# Patient Record
Sex: FEMALE | Race: BLACK OR AFRICAN AMERICAN | NOT HISPANIC OR LATINO | Employment: UNEMPLOYED | URBAN - METROPOLITAN AREA
[De-identification: names, ages, dates, MRNs, and addresses within clinical notes are randomized per-mention and may not be internally consistent; named-entity substitution may affect disease eponyms.]

---

## 2022-08-06 ENCOUNTER — HOSPITAL ENCOUNTER (EMERGENCY)
Facility: OTHER | Age: 32
Discharge: HOME | End: 2022-08-06
Attending: EMERGENCY MEDICINE

## 2022-08-06 VITALS
BODY MASS INDEX: 21.14 KG/M2 | DIASTOLIC BLOOD PRESSURE: 57 MMHG | TEMPERATURE: 99 F | HEIGHT: 61 IN | SYSTOLIC BLOOD PRESSURE: 116 MMHG | RESPIRATION RATE: 16 BRPM | OXYGEN SATURATION: 100 % | WEIGHT: 112 LBS | HEART RATE: 74 BPM

## 2022-08-06 DIAGNOSIS — H66.91 RIGHT OTITIS MEDIA, UNSPECIFIED OTITIS MEDIA TYPE: Primary | ICD-10-CM

## 2022-08-06 DIAGNOSIS — H92.01 RIGHT EAR PAIN: ICD-10-CM

## 2022-08-06 PROCEDURE — 25000003 PHARM REV CODE 250: Performed by: EMERGENCY MEDICINE

## 2022-08-06 PROCEDURE — 99284 EMERGENCY DEPT VISIT MOD MDM: CPT

## 2022-08-06 RX ORDER — AMOXICILLIN AND CLAVULANATE POTASSIUM 875; 125 MG/1; MG/1
1 TABLET, FILM COATED ORAL
Status: COMPLETED | OUTPATIENT
Start: 2022-08-06 | End: 2022-08-06

## 2022-08-06 RX ORDER — OMEPRAZOLE 20 MG/1
20 CAPSULE, DELAYED RELEASE ORAL DAILY PRN
COMMUNITY

## 2022-08-06 RX ORDER — AMOXICILLIN AND CLAVULANATE POTASSIUM 875; 125 MG/1; MG/1
1 TABLET, FILM COATED ORAL 2 TIMES DAILY
Qty: 14 TABLET | Refills: 0 | Status: SHIPPED | OUTPATIENT
Start: 2022-08-06 | End: 2022-09-26

## 2022-08-06 RX ORDER — FLUTICASONE PROPIONATE 50 MCG
1 SPRAY, SUSPENSION (ML) NASAL 2 TIMES DAILY PRN
Qty: 15 G | Refills: 0 | Status: SHIPPED | OUTPATIENT
Start: 2022-08-06

## 2022-08-06 RX ADMIN — AMOXICILLIN AND CLAVULANATE POTASSIUM 1 TABLET: 875; 125 TABLET, FILM COATED ORAL at 02:08

## 2022-08-06 NOTE — ED TRIAGE NOTES
Pt arrived with c/o R ear discomfort x 3 weeks.  States she was rx'd abx/steroid eardrops on Tuesday, but her R ear has become increasingly muffled with pressure.  Reports mild pain to R ear.  Denies any fever.  Ambulatory with steady gait.  Respirations even and unlabored.  aao x 4.

## 2022-08-06 NOTE — DISCHARGE INSTRUCTIONS
Mrs. Almaguer,    Thank you for letting me care for you today! It was nice meeting you, and I hope you feel better soon.   If you would like access to your chart and what was done today please utilize the [facility].   Please come back to [facility] for all of your future medical needs.    Our goal in the emergency department is to always give you outstanding care and exceptional service. You may receive a survey by mail or e-mail in the next week regarding your experience in our ED. We would greatly appreciate you completing and returning the survey. Your feedback provides us with a way to recognize our staff who give very good care and it helps us learn how to improve when your experience was below our aspiration of excellence.     Sincerely,    Tru Rojas MD  Board Certified Emergency Physician

## 2022-08-06 NOTE — ED PROVIDER NOTES
"Encounter Date: 8/6/2022    SCRIBE #1 NOTE: I, Klarissa Robretson, am scribing for, and in the presence of,  Tru Rojas MD. I have scribed the following portions of the note - Other sections scribed: HPI, ROS.       History     Chief Complaint   Patient presents with    Otalgia     Reports R ear pain,decreased hearing, ear fullness. Was seen at  on Tuesday and was given abx and steroids with no relief. Hx of gastroparesis , oral cancer.     Time seen by provider: 2:44 PM    This is a 32 y.o. female, with a PMHx of gastroparesis and oral cancer, who presents with complaint of otalgia of the right ear with worsened onset 2 days PTA. Patient reports that her right ear has been "giving her problems for 3 weeks." She states that she went to urgent care 5 days ago and was prescribed ear drops. She adds that the drops are not working and her symptoms seem to be worsening. Patient states that her right ear feels congested, swollen, and she is experiencing a pressure sensation. She denies nasal congestion. She has not been under water or swimming recently.  She has never anything like this in the past that she can recall, she has not take anything he notes that is not specifically painful it is more just persistently uncomfortable.      The history is provided by the patient.     Review of patient's allergies indicates:   Allergen Reactions    Acetaminophen      "Ha, hot, and my heart feels weird."     Past Medical History:   Diagnosis Date    Cancer     Gastroparesis 2007    Oral cancer 2017     History reviewed. No pertinent surgical history.  History reviewed. No pertinent family history.  Social History     Tobacco Use    Smoking status: Never Smoker    Smokeless tobacco: Never Used   Substance Use Topics    Alcohol use: Never    Drug use: Never     Review of Systems  Constitutional-no fever  HEENT-no congestion, notes right ear pain, no nasal congestion  Eyes-no redness  Respiratory-no shortness of " breath  Cardio-no chest pain  GI-no abdominal pain  Endocrine-no cold intolerance  -no difficulty urinating  MSK-no myalgias  Skin-no rashes  Allergy-no environmental allergy  Neurologic-, no headache  Hematology-no swollen nodes  Behavioral-no confusion    Physical Exam     Initial Vitals [08/06/22 1423]   BP Pulse Resp Temp SpO2   (!) 116/57 74 16 99.2 °F (37.3 °C) 100 %      MAP       --         Physical Exam  Constitutional: Well appearing, no distress.  Eyes: Conjunctivae normal.  ENT       Head: Normocephalic, atraumatic.       Nose: Normal external appearance        Mouth/Throat: no strigulous respirations   Ears-left ears normal in appearance, right ear no pain with manipulation of the or or pinna, tympanic membrane is clouded, mildly bulging,  Hematological/Lymphatic/Immunilogical: no visible lymphadenopathy   Cardiovascular: Normal rate,   Respiratory: Normal respiratory effort.   Gastrointestinal: non distended   Musculoskeletal: Normal range of motion in all extremities. No obvious deformities or swelling.  Neurologic: Alert, oriented. Normal speech and language. No gross focal neurologic deficits are appreciated.  Skin: Skin is warm, dry. No rash noted.  Psychiatric: Mood and affect are normal.   ED Course   Procedures  Labs Reviewed - No data to display       Imaging Results    None          Medications   amoxicillin-clavulanate 875-125mg per tablet 1 tablet (1 tablet Oral Given 8/6/22 1458)     Medical Decision Making:   History:   Old Medical Records: I decided to obtain old medical records.  Differential Diagnosis:   Otitis media, otitis externa, malignant otitis, mastoiditis  ED Management:  Examination is consistent with a likely otitis media incompletely treated with topical drops.  Will plan for oral antibiotics, returning case of worsening an ENT follow-up.          Scribe Attestation:   Scribe #1: I performed the above scribed service and the documentation accurately describes the services I  performed. I attest to the accuracy of the note.               Physician Attestation for Scribe: I, tru rojas, reviewed documentation as scribed in my presence, which is both accurate and complete.    Clinical Impression:   Final diagnoses:  [H66.91] Right otitis media, unspecified otitis media type (Primary)  [H92.01] Right ear pain          ED Disposition Condition    Discharge Stable        ED Prescriptions     Medication Sig Dispense Start Date End Date Auth. Provider    fluticasone propionate (FLONASE) 50 mcg/actuation nasal spray 1 spray (50 mcg total) by Each Nostril route 2 (two) times daily as needed for Rhinitis. 15 g 8/6/2022  Tru Rojas MD    amoxicillin-clavulanate 875-125mg (AUGMENTIN) 875-125 mg per tablet Take 1 tablet by mouth 2 (two) times daily. 14 tablet 8/6/2022  Tru Rojas MD        Follow-up Information     Follow up With Specialties Details Why Contact Info    Tacho Arreola MD Otolaryngology Schedule an appointment as soon as possible for a visit in 1 week For a follow up visit about today, If symptoms worsen [id number]  [facility] [address]  [phone]             Tru Rojas MD  08/06/22 5113

## 2022-08-08 ENCOUNTER — TELEPHONE (OUTPATIENT)
Dept: ADMINISTRATIVE | Facility: OTHER | Age: 32
End: 2022-08-08

## 2022-08-12 ENCOUNTER — HOSPITAL ENCOUNTER (EMERGENCY)
Facility: OTHER | Age: 32
Discharge: HOME | End: 2022-08-12
Attending: EMERGENCY MEDICINE

## 2022-08-12 ENCOUNTER — TELEPHONE (OUTPATIENT)
Dept: ADMINISTRATIVE | Facility: OTHER | Age: 32
End: 2022-08-12

## 2022-08-12 VITALS
BODY MASS INDEX: 21.14 KG/M2 | WEIGHT: 112 LBS | HEART RATE: 78 BPM | HEIGHT: 61 IN | RESPIRATION RATE: 18 BRPM | DIASTOLIC BLOOD PRESSURE: 58 MMHG | OXYGEN SATURATION: 100 % | TEMPERATURE: 98 F | SYSTOLIC BLOOD PRESSURE: 100 MMHG

## 2022-08-12 DIAGNOSIS — H66.91 RIGHT OTITIS MEDIA, UNSPECIFIED OTITIS MEDIA TYPE: Primary | ICD-10-CM

## 2022-08-12 DIAGNOSIS — H60.501 ACUTE OTITIS EXTERNA OF RIGHT EAR, UNSPECIFIED TYPE: ICD-10-CM

## 2022-08-12 PROCEDURE — 99284 EMERGENCY DEPT VISIT MOD MDM: CPT

## 2022-08-12 PROCEDURE — 63600175 PHARM REV CODE 636 W HCPCS: Performed by: NURSE PRACTITIONER

## 2022-08-12 PROCEDURE — 25000003 PHARM REV CODE 250: Performed by: NURSE PRACTITIONER

## 2022-08-12 PROCEDURE — 96372 THER/PROPH/DIAG INJ SC/IM: CPT | Performed by: NURSE PRACTITIONER

## 2022-08-12 RX ORDER — OFLOXACIN 3 MG/ML
10 SOLUTION AURICULAR (OTIC) DAILY
Qty: 4.67 ML | Refills: 0 | Status: SHIPPED | OUTPATIENT
Start: 2022-08-12 | End: 2022-08-19

## 2022-08-12 RX ORDER — LIDOCAINE HYDROCHLORIDE 10 MG/ML
2.1 INJECTION INFILTRATION; PERINEURAL ONCE
Status: COMPLETED | OUTPATIENT
Start: 2022-08-12 | End: 2022-08-12

## 2022-08-12 RX ORDER — OFLOXACIN 3 MG/ML
10 SOLUTION/ DROPS OPHTHALMIC ONCE
Status: COMPLETED | OUTPATIENT
Start: 2022-08-12 | End: 2022-08-12

## 2022-08-12 RX ORDER — CEFTRIAXONE 1 G/1
1 INJECTION, POWDER, FOR SOLUTION INTRAMUSCULAR; INTRAVENOUS ONCE
Status: COMPLETED | OUTPATIENT
Start: 2022-08-12 | End: 2022-08-12

## 2022-08-12 RX ADMIN — CEFTRIAXONE 1 G: 1 INJECTION, POWDER, FOR SOLUTION INTRAMUSCULAR; INTRAVENOUS at 02:08

## 2022-08-12 RX ADMIN — LIDOCAINE HYDROCHLORIDE 2.1 ML: 10 INJECTION, SOLUTION INFILTRATION; PERINEURAL at 02:08

## 2022-08-12 RX ADMIN — OFLOXACIN 10 DROP: 3 SOLUTION OPHTHALMIC at 02:08

## 2022-08-12 NOTE — DISCHARGE INSTRUCTIONS
**Follow up with PCP or ENT in 24-48 hours. Return to ER with worsening of symptoms.     **Over the counter Ibuprofen for pain as needed as directed on package insert. Continue previously prescribed Augmentin.

## 2022-08-12 NOTE — ED TRIAGE NOTES
Pt presents to the ER for evaluation of right ear pain for the past several weeks. Pt reports she initially was seen at urgent care and prescribed Cortisporin ear drops.  She reported no improvement in symptoms and was evaluated in the ER where she was prescribed amoxicillin.  Pt reports some noncompliance due to upset stomach, so she was only able to take half the dose for the past several days.  Today she began taking the full dose.  She noted some increase in pain and drainage of the right ear.

## 2022-08-12 NOTE — ED NOTES
LOC: The patient is awake, alert, and oriented to self, place, time, and situation. Pt is calm and cooperative. Affect is appropriate.  Speech is appropriate and clear.     APPEARANCE: Patient resting on hospital bed in no acute distress.  Patient is clean and well groomed.    SKIN: The skin is warm and dry; color consistent with ethnicity.  Patient has normal skin turgor and moist mucus membranes.  Skin intact; no breakdown or bruising noted.     MUSCULOSKELETAL: Patient moving upper and lower extremities without difficulty; denies pain in the extremities or back.  Denies weakness.     RESPIRATORY: Airway is open and patent. Respirations spontaneous, even, easy, and non-labored.  Patient has a normal effort and rate.  No accessory muscle use noted. Denies cough.     ENT; Pt reporting right ear pain and drainage x one week.    CARDIAC:  Normal rate noted.  No peripheral edema noted. No complaints of chest pain.      ABDOMEN: Soft and non tender to palpation.  No distention noted. Pt denies abdominal pain; denies nausea, vomiting, diarrhea, or constipation.    NEUROLOGIC: Eyes open spontaneously.  Behavior appropriate to situation.  Follows commands; facial expression symmetrical.  Purposeful motor response noted; normal sensation in all extremities. Pt denies headache; denies lightheadedness or dizziness; denies visual disturbances; denies loss of balance; denies unilateral weakness.

## 2022-08-12 NOTE — ED PROVIDER NOTES
"Encounter Date: 8/12/2022       History     Chief Complaint   Patient presents with    Ear Drainage     C/o right ear drainage that began on Wednesday. Stated came to ED x 6 days c/o fullness to right ear prescribed Amoxicillin- Clav 875-125mg. VSS     Chief complaint:  Ear drainage    32-year-old female presents for evaluation of right ear pain that has been ongoing over the past several weeks.  She reports that she initially was seen at urgent care and prescribed Cortisporin ear drops.  She reported no improvement in symptoms and was evaluated in the ER.  She was prescribed amoxicillin.  She reports some noncompliance due to upset stomach, so she was only able to take half the dose for the past several days.  Today she began taking the full dose.  She noted some increase in pain and drainage of the right ear, which prompted today's evaluation.  No fever.  Denies chance of pregnancy.  This is the extent of patient's complaints for this ER encounter.         The history is provided by the patient.     Review of patient's allergies indicates:   Allergen Reactions    Acetaminophen      "Ha, hot, and my heart feels weird."     Past Medical History:   Diagnosis Date    Cancer     Gastroparesis 2007    Oral cancer 2017     No past surgical history on file.  No family history on file.  Social History     Tobacco Use    Smoking status: Never Smoker    Smokeless tobacco: Never Used   Substance Use Topics    Alcohol use: Never    Drug use: Never     Review of Systems   Constitutional: Negative for fever.   HENT: Positive for ear discharge and ear pain. Negative for congestion, rhinorrhea and sore throat.    Respiratory: Negative for cough and shortness of breath.    Cardiovascular: Negative for chest pain.   Gastrointestinal: Negative for abdominal pain.   Genitourinary: Negative for difficulty urinating.   Musculoskeletal: Negative for arthralgias, back pain, myalgias and neck pain.   Skin: Negative for rash and " wound.   Neurological: Negative for weakness and headaches.   Psychiatric/Behavioral: Negative.        Physical Exam     Initial Vitals [08/12/22 1311]   BP Pulse Resp Temp SpO2   106/62 78 18 98.2 °F (36.8 °C) 99 %      MAP       --         Physical Exam    Vitals reviewed.  Constitutional: No distress.   HENT:   Head: Normocephalic and atraumatic.   Right Ear: There is drainage, swelling and tenderness. No foreign bodies. No mastoid tenderness. Tympanic membrane is injected, erythematous and bulging.   Nose: Nose normal.   Mouth/Throat: Oropharynx is clear and moist and mucous membranes are normal.   Eyes: Conjunctivae, EOM and lids are normal. Right pupil is round. Left pupil is round. Pupils are equal.   Cardiovascular: Normal rate, regular rhythm and normal heart sounds.   Pulmonary/Chest: Effort normal and breath sounds normal. No respiratory distress.   Musculoskeletal:         General: Normal range of motion.     Neurological: She is alert and oriented to person, place, and time. She has normal strength.   Skin: Skin is warm and dry. No rash noted.   Psychiatric: She has a normal mood and affect. Her speech is normal and behavior is normal.         ED Course   Procedures  Labs Reviewed - No data to display       Imaging Results    None          Medications   ofloxacin 0.3 % otic solution 10 drop (has no administration in time range)   cefTRIAXone injection 1 g (has no administration in time range)   LIDOcaine HCL 10 mg/ml (1%) injection 2.1 mL (has no administration in time range)     Medical Decision Making:   Initial Assessment:   32-year-old female presents for evaluation of continued ear pain with drainage despite being on amoxicillin antibiotics.  ED Management:  Physical exam findings consistent with acute otitis media and acute otitis externa.  TM remains intact.  Vital signs are stable without fever.  Will give Rocephin IM.  Continue Augmentin antibiotics as previously prescribed.  Will add ofloxacin  drops.  ENT referral placed.    Patient/caregiver voices understanding and feels comfortable with discharge plan.    The patient/caregiver acknowledges that close follow up with medical provider is required. Instructed to follow up with PCP or ENT within 2 days. Patient/caregiver was given specific return precautions. The patient/caregiver agrees to comply with all instruction and directions given in the ER.                       Clinical Impression:   Final diagnoses:  [H66.91] Right otitis media, unspecified otitis media type (Primary)  [H60.501] Acute otitis externa of right ear, unspecified type          ED Disposition Condition    Discharge Stable        ED Prescriptions     Medication Sig Dispense Start Date End Date Auth. Provider    ofloxacin (FLOXIN) 0.3 % otic solution Place 10 drops into the right ear once daily. for 7 days 4.67 mL 8/12/2022 8/19/2022 Sara Delgado NP        Follow-up Information     Follow up With Specialties Details Why Contact Info    [facility] E[facility] Otolaryngology Schedule an appointment as soon as possible for a visit in 2 days  [address] [facility] [address]  [phone]           Sara Delgado NP  08/12/22 9984

## 2022-08-15 ENCOUNTER — NURSE TRIAGE (OUTPATIENT)
Dept: ADMINISTRATIVE | Facility: CLINIC | Age: 32
End: 2022-08-15

## 2022-08-15 NOTE — TELEPHONE ENCOUNTER
OOC Rn Patient calling was seen Friday 8/12/22  Got shot of Rocephin,    Taking Amoxicillin, since the 9/6/22 and has been cutting the ATB in half.   And now a have a rash is on both forearms. Showed up  today on 8/15/22      Patient wants to be seen today. Wants in person.   [facility],  For any new or worsening to call back OOC RN.  Warm transfer to [facility] new appt. Todayl.     Reason for Disposition   Patient wants to be seen    Additional Information   Negative: Sounds like a life-threatening emergency to the triager   Negative: Fever and localized purple or blood-colored spots or dots that are not from injury or friction   Negative: Fever and localized rash is very painful   Negative: Patient sounds very sick or weak to the triager   Negative: Looks like a boil, infected sore, deep ulcer, or other infected rash (spreading redness, pus)   Negative: Painful rash with multiple small blisters grouped together (i.e., dermatomal distribution or 'band' or 'stripe')   Negative: Localized rash is very painful (no fever)   Negative: Localized purple or blood-colored spots or dots that are not from injury or friction (no fever)   Negative: Lyme disease suspected (e.g., bull's-eye rash or tick bite / exposure)    Protocols used: RASH OR REDNESS - [id number]

## 2022-08-16 ENCOUNTER — HOSPITAL ENCOUNTER (EMERGENCY)
Facility: OTHER | Age: 32
Discharge: HOME | End: 2022-08-17
Attending: EMERGENCY MEDICINE

## 2022-08-16 DIAGNOSIS — T78.40XA ALLERGIC REACTION, INITIAL ENCOUNTER: Primary | ICD-10-CM

## 2022-08-16 PROCEDURE — 63600175 PHARM REV CODE 636 W HCPCS: Performed by: PHYSICIAN ASSISTANT

## 2022-08-16 PROCEDURE — 99284 EMERGENCY DEPT VISIT MOD MDM: CPT | Mod: 25

## 2022-08-16 PROCEDURE — 96375 TX/PRO/DX INJ NEW DRUG ADDON: CPT

## 2022-08-16 PROCEDURE — 25000003 PHARM REV CODE 250: Performed by: PHYSICIAN ASSISTANT

## 2022-08-16 PROCEDURE — 96374 THER/PROPH/DIAG INJ IV PUSH: CPT

## 2022-08-16 RX ORDER — FAMOTIDINE 10 MG/ML
20 INJECTION INTRAVENOUS
Status: COMPLETED | OUTPATIENT
Start: 2022-08-16 | End: 2022-08-16

## 2022-08-16 RX ORDER — DIPHENHYDRAMINE HYDROCHLORIDE 50 MG/ML
25 INJECTION INTRAMUSCULAR; INTRAVENOUS
Status: COMPLETED | OUTPATIENT
Start: 2022-08-16 | End: 2022-08-16

## 2022-08-16 RX ORDER — METHYLPREDNISOLONE SOD SUCC 125 MG
125 VIAL (EA) INJECTION
Status: COMPLETED | OUTPATIENT
Start: 2022-08-16 | End: 2022-08-16

## 2022-08-16 RX ADMIN — FAMOTIDINE 20 MG: 10 INJECTION INTRAVENOUS at 11:08

## 2022-08-16 RX ADMIN — METHYLPREDNISOLONE SODIUM SUCCINATE 125 MG: 125 INJECTION, POWDER, FOR SOLUTION INTRAMUSCULAR; INTRAVENOUS at 11:08

## 2022-08-16 RX ADMIN — DIPHENHYDRAMINE HYDROCHLORIDE 25 MG: 50 INJECTION, SOLUTION INTRAMUSCULAR; INTRAVENOUS at 11:08

## 2022-08-17 ENCOUNTER — TELEPHONE (OUTPATIENT)
Dept: ADMINISTRATIVE | Facility: OTHER | Age: 32
End: 2022-08-17

## 2022-08-17 VITALS
HEART RATE: 59 BPM | WEIGHT: 112 LBS | SYSTOLIC BLOOD PRESSURE: 116 MMHG | TEMPERATURE: 98 F | RESPIRATION RATE: 19 BRPM | DIASTOLIC BLOOD PRESSURE: 63 MMHG | BODY MASS INDEX: 21.16 KG/M2 | OXYGEN SATURATION: 100 %

## 2022-08-17 NOTE — ED TRIAGE NOTES
Pt presents to the ED c/o allergic reaction. Pt reports eating shrimp for the past x2 nights and now developing hives and irritation noted to extremities and back. Denies difficulty breathing. Denies any other complaints at this time. AAOx4

## 2022-08-17 NOTE — ED PROVIDER NOTES
"Encounter Date: 8/16/2022       History     Chief Complaint   Patient presents with    Allergic Reaction     Pt ate some shrimp two nights in row and is now presenting with hives and irritation.  Hives and irritation noted to extremities and back.     Pt denies any sob and does not present with angio-edema at this time.  CBBS RR17 & 100% via room.       Patient is a 32-year-old female with no significant medical history who presents to the emergency department with concern for an allergic reaction.  She reports she has been receiving antibiotics that she finished yesterday for an ear infection.  She reports on Friday she received an IM injection of Rocephin for the ear infection.  She states she was feeling fine until 2 nights ago shortly after eating shrimp.  She reports she has never been allergic to shrimp before.  She reports within an hour she developed hives all over her body with extreme itching.  She states she took Benadryl and the symptoms resolved.  She states she ate shrimp again yesterday and the symptoms re-presented.  She reports she woke up this morning with worsening hives.  She denies any chest tightness or shortness of breath.  She denies oral cavity swelling.  She denies difficulty swallowing.  She denies nausea or vomiting.    The history is provided by the patient.     Review of patient's allergies indicates:   Allergen Reactions    Acetaminophen      "Ha, hot, and my heart feels weird."     Past Medical History:   Diagnosis Date    Cancer     Gastroparesis 2007    Oral cancer 2017     History reviewed. No pertinent surgical history.  History reviewed. No pertinent family history.  Social History     Tobacco Use    Smoking status: Never Smoker    Smokeless tobacco: Never Used   Substance Use Topics    Alcohol use: Never    Drug use: Never     Review of Systems   Constitutional: Negative for activity change, appetite change, chills, fatigue and fever.   HENT: Negative for congestion, " ear discharge, ear pain, postnasal drip, rhinorrhea, sore throat and trouble swallowing.    Respiratory: Negative for cough and shortness of breath.    Cardiovascular: Negative for chest pain.   Gastrointestinal: Negative for abdominal pain, blood in stool, constipation, diarrhea, nausea and vomiting.   Genitourinary: Negative for dysuria, flank pain and hematuria.   Musculoskeletal: Negative for back pain, neck pain and neck stiffness.   Skin: Positive for rash.   Neurological: Negative for dizziness, light-headedness and headaches.       Physical Exam     Initial Vitals [08/16/22 2300]   BP Pulse Resp Temp SpO2   131/75 76 18 97.9 °F (36.6 °C) 100 %      MAP       --         Physical Exam    Nursing note and vitals reviewed.  Constitutional: She appears well-developed and well-nourished. She is not diaphoretic. No distress.   HENT:   Head: Normocephalic.   Right Ear: Hearing and external ear normal.   Left Ear: Hearing and external ear normal.   Nose: Nose normal.   Mouth/Throat: Uvula is midline, oropharynx is clear and moist and mucous membranes are normal. No trismus in the jaw. No uvula swelling. No oropharyngeal exudate.   Eyes: Conjunctivae are normal. Pupils are equal, round, and reactive to light.   Neck:   Normal range of motion.  Cardiovascular: Normal rate and regular rhythm.   Pulmonary/Chest: Breath sounds normal.   Abdominal: Abdomen is soft. Bowel sounds are normal. There is no abdominal tenderness.   Musculoskeletal:      Cervical back: Normal range of motion.     Neurological: She is alert and oriented to person, place, and time.   Skin: Skin is warm and dry. Capillary refill takes less than 2 seconds.     Urticarial lesions to trunk and upper extremities   Psychiatric: She has a normal mood and affect.         ED Course   Procedures  Labs Reviewed - No data to display       Imaging Results    None          Medications   diphenhydrAMINE injection 25 mg (25 mg Intravenous Given 8/16/22 7761)    methylPREDNISolone sodium succinate injection 125 mg (125 mg Intravenous Given 8/16/22 0512)   famotidine (PF) injection 20 mg (20 mg Intravenous Given 8/16/22 2321)     Medical Decision Making:   Initial Assessment:    Urgent evaluation of a 32-year-old female who presents to the emergency department with hives.  Patient is afebrile, nontoxic appearing, hemodynamically stable.  Symptoms have been intermittent over the last 2 days.  She has no oral cavity swelling.  No difficulty swallowing.  No chest tightness or shortness of breath.  No nausea or vomiting.  Patient does have urticarial lesions noted to upper extremities and trunk.  Unsure if this is due to the shellfish or antibiotics.  Patient is finished her antibiotics.  She is advised to avoid shellfish at this time.  She will be given Solu-Medrol and Benadryl here.  Do not feel that epi is warranted at this time.    ED Management:  11:54 PM  Plan will be to observe her for another hour and then discharge if continuing to improve.                      Clinical Impression:   Final diagnoses:  [T78.40XA] Allergic reaction, initial encounter (Primary)                 Ronda Cantu PA-C  08/16/22 2078

## 2022-09-24 NOTE — PROGRESS NOTES
ALLERGY & IMMUNOLOGY CLINIC - INITIAL CONSULTATION      HISTORY OF PRESENT ILLNESS     Patient ID: Laurie Almaguer is a 32 y.o. female    CC: rash    HPI: Laurie Almaguer is a 32 y.o. female presenting for a recent rash.  She was referred by Ronda Cantu PA-C (EM).    On 8/15/22, she had shrimp and grits for dinner. Had hives on her arms the next morning (8/16/22). She had a ceftriaxone shot on 8/12/22 for an ear infection. She had shrimp again the night of 8/16/22. Still had the rash. The next morning, it was more widespread, very itchy. She had taken a benadryl on the 16th without help. She started feeling hot and flushed. She went to ED on 8/17/22. She received benadryl, famotidine, and IV steroid. Rash was still there, but itching got better. Rash went down over next 2-3 days. The individual lesions were there for days. No respiratory or GI symptoms. The cousin told her she woke up the same day with a similar rash (but the patient didn't actually see it). The cousin had eaten with them the night before. The day before the rash, they had been moving their other cousin into dorm at [facility]. There was a lot of outdoor activity.    Rash was on her arms, sides of her torso, and the back of her legs.    She has not had shellfish since.    She had never had a rash like this before.    She has been getting mouth itching with cantaloupe. Also starting to occur with honeydew.    She gets rhinitis, but its not too bothersome. Only takes benadryl prn, doesn't usually need to take medications for it.     REVIEW OF SYSTEMS     CONST: no F/C/NS, no unexplained weight changes  PULM: no SOB, no wheezing, no cough  GI: no pain, no N/V/D  DERM: no current rashes, no skin breaks     MEDICAL HISTORY     Vaccines:      There is no immunization history on file for this patient.    MedHx:   There is no problem list on file for this patient.      SurgHx:   History reviewed. No pertinent surgical history.    Medications:  "  Current Outpatient Medications on File Prior to Visit   Medication Sig Dispense Refill    fluticasone propionate (FLONASE) 50 mcg/actuation nasal spray 1 spray (50 mcg total) by Each Nostril route 2 (two) times daily as needed for Rhinitis. (Patient not taking: Reported on 9/26/2022) 15 g 0    omeprazole (PRILOSEC) 20 MG capsule Take 20 mg by mouth daily as needed.      [DISCONTINUED] amoxicillin-clavulanate 875-125mg (AUGMENTIN) 875-125 mg per tablet Take 1 tablet by mouth 2 (two) times daily. 14 tablet 0     No current facility-administered medications on file prior to visit.     H/o Asthma: denies  H/o Eczema: denies  H/o Rhinitis: endorses, mild    Drug Allergies:   Review of patient's allergies indicates:   Allergen Reactions    Acetaminophen      "Ha, hot, and my heart feels weird."      Env/Occ:   Pets: no  Occupation: in school, studying music therapy    SocHx:   Social History     Tobacco Use    Smoking status: Never    Smokeless tobacco: Never   Substance Use Topics    Alcohol use: Never    Drug use: Never       FamHx:   Family History   Problem Relation Age of Onset    Allergies Mother     Allergies Sister     Allergies Brother         PHYSICAL EXAM     VS: Ht 5' 1" (1.549 m)   Wt 49.6 kg (109 lb 5.6 oz)   BMI 20.66 kg/m²   GENERAL: Alert, NAD, well-appearing, cooperative  EYES: EOMI, no conjunctival injection, no discharge, no infraorbital shiners  NOSE: NT 2-3 + B/L, no stringing mucus, no polyps visualized  ORAL: MMM, no ulcers, no thrush  LUNGS: CTAB, no w/r/c, no increased WOB  HEART: RRR, normal S1/S2, no m/g/r  EXTREMITIES: No LE edema  DERM: no rashes, no skin breaks  NEURO: normal speech, normal gait, no facial asymmetry     LABORATORY STUDIES     No pertinent labs for this visit.     ALLERGEN TESTING     Immunocaps: ordered for shrimp     CHART REVIEW     Reviewed ED note.     ASSESSMENT & PLAN     Laurie Almaguer is a 32 y.o. female with     # Rash: Patient was concerned she had urticaria " due to shrimp, but photos more consistent with a papular rash. And timing would not be consistent (started the next morning; lasted days). On 8/12/22, she had a ceftriaxone injection for an ear infection. On 8/15, she moved her cousin into dorm at [facility], spent a lot of time outside (other cousin also helped). On 8/16, rash started. On 8/17, went to ED where she received antihistamines and steroid injection. Pruritus improved while there, rash improved over next 2-3 days. Patient also learned that her other cousin who helped developed a rash on 8/16/22. I explained that this is not consistent with a food allergy, but will do testing for shrimp for reassurance per patient's request. I think the more likely culprit would either be the ceftriaxone (a delayed drug rash) or an allergic contact derm to something they were exposed to while moving cousin into dorm (which could explain why the other cousin got a rash at the same time). I explained that skin prick testing for ceftriaxone would not be helpful, as this would evaluate for an immediate IgE-mediated allergy. I also explained that the only way to know for sure whether it was the ceftriaxone would be to receive it again.  -Discussed with patient. We decided to refrain from putting ceftriaxone on her allergy list, as this could result in unnecessary avoidance of other cephalosporins (or even penicillins). Patient knows that if she is ever offered ceftriaxone again, this could result in rash, but this would help to determine whether she is allergic. Explained that the reaction would not be considered life-threatening, and can be treated with corticosteroids again.  -immunocaps for shrimp ordered. Counseled patient that a positive result does not always equate to clinical allergy. Explained that while a negative test would be reassuring, a positive test would not be convincing of allergy, especially as her symptoms were not consistent with this. Would likely recommend  reintroducing shrimp into diet regardless of result.    # Oral allergy syndrome: Occurs with cantaloupe, also starting to happen with honeydew. Explained the nature of food-pollen allergy syndrome. Explained that it is unlikely to develop into systemic allergy.    Follow up: as needed.     I spent a total of 45 minutes on the day of the visit.  This includes face to face time and non-face to face time preparing to see the patient (eg, review of tests), obtaining and/or reviewing separately obtained history, documenting clinical information in the electronic or other health record, independently interpreting results and communicating results to the patient/family/caregiver, or care coordinator.        Sandee Carcamo MD  Allergy/Immunology

## 2022-09-26 ENCOUNTER — OFFICE VISIT (OUTPATIENT)
Dept: ALLERGY | Facility: CLINIC | Age: 32
End: 2022-09-26

## 2022-09-26 ENCOUNTER — LAB VISIT (OUTPATIENT)
Dept: LAB | Facility: HOSPITAL | Age: 32
End: 2022-09-26
Attending: INTERNAL MEDICINE

## 2022-09-26 VITALS — WEIGHT: 109.38 LBS | BODY MASS INDEX: 20.65 KG/M2 | HEIGHT: 61 IN

## 2022-09-26 DIAGNOSIS — R21 RASH: ICD-10-CM

## 2022-09-26 DIAGNOSIS — T78.1XXA POLLEN-FOOD ALLERGY, INITIAL ENCOUNTER: ICD-10-CM

## 2022-09-26 DIAGNOSIS — R21 RASH: Primary | ICD-10-CM

## 2022-09-26 PROCEDURE — 86003 ALLG SPEC IGE CRUDE XTRC EA: CPT | Performed by: STUDENT IN AN ORGANIZED HEALTH CARE EDUCATION/TRAINING PROGRAM

## 2022-09-26 PROCEDURE — 99999 PR PBB SHADOW E&M-EST. PATIENT-LVL III: CPT | Mod: PBBFAC,,, | Performed by: STUDENT IN AN ORGANIZED HEALTH CARE EDUCATION/TRAINING PROGRAM

## 2022-09-26 PROCEDURE — 99999 PR PBB SHADOW E&M-EST. PATIENT-LVL III: ICD-10-PCS | Mod: PBBFAC,,, | Performed by: STUDENT IN AN ORGANIZED HEALTH CARE EDUCATION/TRAINING PROGRAM

## 2022-09-26 PROCEDURE — 99213 OFFICE O/P EST LOW 20 MIN: CPT | Mod: PBBFAC,PO | Performed by: STUDENT IN AN ORGANIZED HEALTH CARE EDUCATION/TRAINING PROGRAM

## 2022-09-26 PROCEDURE — 36415 COLL VENOUS BLD VENIPUNCTURE: CPT | Mod: PN | Performed by: STUDENT IN AN ORGANIZED HEALTH CARE EDUCATION/TRAINING PROGRAM

## 2022-09-26 PROCEDURE — 99204 PR OFFICE/OUTPT VISIT, NEW, LEVL IV, 45-59 MIN: ICD-10-PCS | Mod: S$PBB,,, | Performed by: STUDENT IN AN ORGANIZED HEALTH CARE EDUCATION/TRAINING PROGRAM

## 2022-09-26 PROCEDURE — 99204 OFFICE O/P NEW MOD 45 MIN: CPT | Mod: S$PBB,,, | Performed by: STUDENT IN AN ORGANIZED HEALTH CARE EDUCATION/TRAINING PROGRAM

## 2022-09-29 LAB
DEPRECATED SHRIMP IGE RAST QL: ABNORMAL
SHRIMP IGE QN: 0.14 KU/L

## 2022-09-30 ENCOUNTER — TELEPHONE (OUTPATIENT)
Dept: ALLERGY | Facility: CLINIC | Age: 32
End: 2022-09-30

## 2022-09-30 NOTE — TELEPHONE ENCOUNTER
----- Message from Sandee Carcamo MD sent at 9/30/2022  1:13 PM CDT -----  Regarding: call with result  Hello,  This patient does not have the portal. Can someone please call the patient to let her know of her result for shrimp allergy testing. The test was negative for evidence of allergy to shrimp (while the result was slightly elevated above zero, it was not high enough to be considered positive). She may reintroduce shrimp at home.    Thanks,  Sandee Carcamo MD  Allergy/Immunology